# Patient Record
Sex: MALE | Race: WHITE
[De-identification: names, ages, dates, MRNs, and addresses within clinical notes are randomized per-mention and may not be internally consistent; named-entity substitution may affect disease eponyms.]

---

## 2020-03-01 ENCOUNTER — HEALTH MAINTENANCE LETTER (OUTPATIENT)
Age: 40
End: 2020-03-01

## 2020-03-05 ENCOUNTER — HOSPITAL ENCOUNTER (EMERGENCY)
Dept: HOSPITAL 8 - ED | Age: 40
Discharge: HOME | End: 2020-03-05
Payer: COMMERCIAL

## 2020-03-05 VITALS — BODY MASS INDEX: 25.22 KG/M2 | HEIGHT: 71 IN | WEIGHT: 180.12 LBS

## 2020-03-05 VITALS — SYSTOLIC BLOOD PRESSURE: 122 MMHG | DIASTOLIC BLOOD PRESSURE: 67 MMHG

## 2020-03-05 DIAGNOSIS — R11.2: ICD-10-CM

## 2020-03-05 DIAGNOSIS — K29.00: Primary | ICD-10-CM

## 2020-03-05 DIAGNOSIS — R94.31: ICD-10-CM

## 2020-03-05 DIAGNOSIS — E86.0: ICD-10-CM

## 2020-03-05 LAB
ALBUMIN SERPL-MCNC: 3.8 G/DL (ref 3.4–5)
ALP SERPL-CCNC: 114 U/L (ref 45–117)
ALT SERPL-CCNC: 66 U/L (ref 12–78)
ANION GAP SERPL CALC-SCNC: 7 MMOL/L (ref 5–15)
BASOPHILS # BLD AUTO: 0.02 X10^3/UL (ref 0–0.1)
BASOPHILS NFR BLD AUTO: 0 % (ref 0–1)
BILIRUB SERPL-MCNC: 0.7 MG/DL (ref 0.2–1)
CALCIUM SERPL-MCNC: 8.3 MG/DL (ref 8.5–10.1)
CHLORIDE SERPL-SCNC: 100 MMOL/L (ref 98–107)
CREAT SERPL-MCNC: 1.06 MG/DL (ref 0.7–1.3)
EOSINOPHIL # BLD AUTO: 0.09 X10^3/UL (ref 0–0.4)
EOSINOPHIL NFR BLD AUTO: 1 % (ref 1–7)
ERYTHROCYTE [DISTWIDTH] IN BLOOD BY AUTOMATED COUNT: 12.5 % (ref 9.4–14.8)
LYMPHOCYTES # BLD AUTO: 1.87 X10^3/UL (ref 1–3.4)
LYMPHOCYTES NFR BLD AUTO: 24 % (ref 22–44)
MCH RBC QN AUTO: 33.3 PG (ref 27.5–34.5)
MCHC RBC AUTO-ENTMCNC: 34.6 G/DL (ref 33.2–36.2)
MCV RBC AUTO: 96.4 FL (ref 81–97)
MD: NO
MONOCYTES # BLD AUTO: 0.49 X10^3/UL (ref 0.2–0.8)
MONOCYTES NFR BLD AUTO: 6 % (ref 2–9)
NEUTROPHILS # BLD AUTO: 5.18 X10^3/UL (ref 1.8–6.8)
NEUTROPHILS NFR BLD AUTO: 68 % (ref 42–75)
PLATELET # BLD AUTO: 140 X10^3/UL (ref 130–400)
PMV BLD AUTO: 6.4 FL (ref 7.4–10.4)
PROT SERPL-MCNC: 7.4 G/DL (ref 6.4–8.2)
RBC # BLD AUTO: 4.9 X10^6/UL (ref 4.38–5.82)

## 2020-03-05 PROCEDURE — 71045 X-RAY EXAM CHEST 1 VIEW: CPT

## 2020-03-05 PROCEDURE — 96360 HYDRATION IV INFUSION INIT: CPT

## 2020-03-05 PROCEDURE — 99285 EMERGENCY DEPT VISIT HI MDM: CPT

## 2020-03-05 PROCEDURE — 36415 COLL VENOUS BLD VENIPUNCTURE: CPT

## 2020-03-05 PROCEDURE — 80053 COMPREHEN METABOLIC PANEL: CPT

## 2020-03-05 PROCEDURE — 85025 COMPLETE CBC W/AUTO DIFF WBC: CPT

## 2020-03-05 PROCEDURE — 83690 ASSAY OF LIPASE: CPT

## 2020-03-05 PROCEDURE — 93005 ELECTROCARDIOGRAM TRACING: CPT

## 2020-03-05 NOTE — NUR
THIS IS A 39 O M W/ C/O N/V X12 DAYS. PT DENIES SPECIFIC ABD PAIN. PT IS 
RESTING ON [employer] W/ CALL LIGHT IN REACH. LAB IN ROOM.
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
## 2020-03-06 ENCOUNTER — HOSPITAL ENCOUNTER (EMERGENCY)
Dept: HOSPITAL 8 - ED | Age: 40
Discharge: HOME | End: 2020-03-06
Payer: SELF-PAY

## 2020-03-06 VITALS — WEIGHT: 180.38 LBS | BODY MASS INDEX: 25.82 KG/M2 | HEIGHT: 70 IN

## 2020-03-06 VITALS — DIASTOLIC BLOOD PRESSURE: 92 MMHG | SYSTOLIC BLOOD PRESSURE: 149 MMHG

## 2020-03-06 DIAGNOSIS — F17.210: ICD-10-CM

## 2020-03-06 DIAGNOSIS — F14.10: ICD-10-CM
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
## 2020-03-06 DIAGNOSIS — R11.2: Primary | ICD-10-CM

## 2020-03-06 DIAGNOSIS — F10.10: ICD-10-CM

## 2020-03-06 LAB
ALBUMIN SERPL-MCNC: 3.8 G/DL (ref 3.4–5)
ALP SERPL-CCNC: 107 U/L (ref 45–117)
ALT SERPL-CCNC: 54 U/L (ref 12–78)
ANION GAP SERPL CALC-SCNC: 13 MMOL/L (ref 5–15)
BASOPHILS # BLD AUTO: 0.01 X10^3/UL (ref 0–0.1)
BASOPHILS NFR BLD AUTO: 0 % (ref 0–1)
BILIRUB SERPL-MCNC: 1.2 MG/DL (ref 0.2–1)
CALCIUM SERPL-MCNC: 8.6 MG/DL (ref 8.5–10.1)
CHLORIDE SERPL-SCNC: 105 MMOL/L (ref 98–107)
CREAT SERPL-MCNC: 1.14 MG/DL (ref 0.7–1.3)
EOSINOPHIL # BLD AUTO: 0 X10^3/UL (ref 0–0.4)
EOSINOPHIL NFR BLD AUTO: 0 % (ref 1–7)
ERYTHROCYTE [DISTWIDTH] IN BLOOD BY AUTOMATED COUNT: 13.1 % (ref 9.4–14.8)
LYMPHOCYTES # BLD AUTO: 0.35 X10^3/UL (ref 1–3.4)
LYMPHOCYTES NFR BLD AUTO: 4 % (ref 22–44)
MCH RBC QN AUTO: 33 PG (ref 27.5–34.5)
MCHC RBC AUTO-ENTMCNC: 34.6 G/DL (ref 33.2–36.2)
MCV RBC AUTO: 95.3 FL (ref 81–97)
MD: NO
MONOCYTES # BLD AUTO: 0.46 X10^3/UL (ref 0.2–0.8)
MONOCYTES NFR BLD AUTO: 5 % (ref 2–9)
NEUTROPHILS # BLD AUTO: 8.34 X10^3/UL (ref 1.8–6.8)
NEUTROPHILS NFR BLD AUTO: 91 % (ref 42–75)
PLATELET # BLD AUTO: 112 X10^3/UL (ref 130–400)
PMV BLD AUTO: 6.5 FL (ref 7.4–10.4)
PROT SERPL-MCNC: 7.2 G/DL (ref 6.4–8.2)
RBC # BLD AUTO: 4.2 X10^6/UL (ref 4.38–5.82)

## 2020-03-06 PROCEDURE — 96361 HYDRATE IV INFUSION ADD-ON: CPT

## 2020-03-06 PROCEDURE — 80307 DRUG TEST PRSMV CHEM ANLYZR: CPT

## 2020-03-06 PROCEDURE — 36415 COLL VENOUS BLD VENIPUNCTURE: CPT

## 2020-03-06 PROCEDURE — 80053 COMPREHEN METABOLIC PANEL: CPT

## 2020-03-06 PROCEDURE — 96374 THER/PROPH/DIAG INJ IV PUSH: CPT

## 2020-03-06 PROCEDURE — 99283 EMERGENCY DEPT VISIT LOW MDM: CPT

## 2020-03-06 PROCEDURE — 83690 ASSAY OF LIPASE: CPT

## 2020-03-06 PROCEDURE — 85025 COMPLETE CBC W/AUTO DIFF WBC: CPT

## 2020-03-08 ENCOUNTER — HOSPITAL ENCOUNTER (EMERGENCY)
Facility: MEDICAL CENTER | Age: 40
End: 2020-03-08
Attending: EMERGENCY MEDICINE

## 2020-03-08 VITALS
WEIGHT: 180 LBS | RESPIRATION RATE: 16 BRPM | BODY MASS INDEX: 25.77 KG/M2 | SYSTOLIC BLOOD PRESSURE: 133 MMHG | TEMPERATURE: 99 F | OXYGEN SATURATION: 96 % | HEART RATE: 118 BPM | HEIGHT: 70 IN | DIASTOLIC BLOOD PRESSURE: 96 MMHG

## 2020-03-08 DIAGNOSIS — F22 PARANOID (HCC): ICD-10-CM

## 2020-03-08 DIAGNOSIS — F14.90 COCAINE USE: ICD-10-CM

## 2020-03-08 LAB
AMPHET UR QL SCN: NEGATIVE
BARBITURATES UR QL SCN: NEGATIVE
BENZODIAZ UR QL SCN: NEGATIVE
BZE UR QL SCN: POSITIVE
CANNABINOIDS UR QL SCN: NEGATIVE
METHADONE UR QL SCN: NEGATIVE
OPIATES UR QL SCN: NEGATIVE
OXYCODONE UR QL SCN: NEGATIVE
PCP UR QL SCN: NEGATIVE
POC BREATHALIZER: 0 PERCENT (ref 0–0.01)
PROPOXYPH UR QL SCN: NEGATIVE

## 2020-03-08 PROCEDURE — 90791 PSYCH DIAGNOSTIC EVALUATION: CPT

## 2020-03-08 PROCEDURE — 80307 DRUG TEST PRSMV CHEM ANLYZR: CPT

## 2020-03-08 PROCEDURE — 302970 POC BREATHALIZER: Performed by: EMERGENCY MEDICINE

## 2020-03-08 PROCEDURE — 99284 EMERGENCY DEPT VISIT MOD MDM: CPT

## 2020-03-08 RX ORDER — ACYCLOVIR 200 MG/1
200 CAPSULE ORAL 4 TIMES DAILY
COMMUNITY
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
## 2020-03-08 NOTE — DISCHARGE INSTRUCTIONS
Cocaine Abuse  PROBLEMS FROM USING COCAINE:   · Highly addictive.   · Illegal.   · Risk of sudden death.   · Heart disease.   · Irregular heart beat.   · High blood pressure.   · Damage to nose and lungs.   · Severe agitation.   · Hallucinations.   · Violent behavior.   · Paranoia.   · Sexual dysfunction.   Most cocaine users deny that they have a problem with addiction. The biggest problem is admitting that you are dependent on cocaine. Those trying to quit using it may experience depression and withdrawal symptoms.  Other withdrawal symptoms include fatigue, suicidal thoughts, sleepiness, restlessness, anxiety, and increased craving for cocaine.  There are medications available to help prevent depression associated with stopping cocaine. Most users will find a support group or treatment program helpful in coming off and staying off cocaine. The best chance to cure cocaine addiction is to go into group therapy and to be in a drug-free environment. It is very important to develop healthy relationships and avoid socializing with people who use or deal drugs.  Eat well, and give your body the proper rest and healthy exercise it needs. You may need medication to help treat withdrawal symptoms. Call your caregiver or a drug treatment center for more help.   You may also want to call the National [place] on Drug Abuse at 536-344-HELP in the [place]  SEEK IMMEDIATE MEDICAL CARE IF:  · You develop severe chest pain.   · You develop shortness of breath.   · You develop extreme agitation.   Document Released: 01/25/2006 Document Revised: 03/11/2013 Document Reviewed: 10/20/2010  [employer]® Patient Information ©2013 [employer].
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
## 2020-03-08 NOTE — ED NOTES
Discharge instructions given and explained to pt including f/u care. All questions answered and pt verbalized understanding.